# Patient Record
Sex: FEMALE | Race: OTHER | HISPANIC OR LATINO | URBAN - METROPOLITAN AREA
[De-identification: names, ages, dates, MRNs, and addresses within clinical notes are randomized per-mention and may not be internally consistent; named-entity substitution may affect disease eponyms.]

---

## 2022-05-07 ENCOUNTER — EMERGENCY (EMERGENCY)
Facility: HOSPITAL | Age: 32
LOS: 1 days | Discharge: HOME | End: 2022-05-07
Admitting: EMERGENCY MEDICINE
Payer: COMMERCIAL

## 2022-05-07 VITALS
OXYGEN SATURATION: 99 % | RESPIRATION RATE: 16 BRPM | DIASTOLIC BLOOD PRESSURE: 88 MMHG | WEIGHT: 156.09 LBS | TEMPERATURE: 98 F | SYSTOLIC BLOOD PRESSURE: 131 MMHG | HEART RATE: 96 BPM | HEIGHT: 65 IN

## 2022-05-07 PROCEDURE — 99283 EMERGENCY DEPT VISIT LOW MDM: CPT
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
## 2022-05-07 NOTE — ED PROVIDER NOTE - PATIENT PORTAL LINK FT
You can access the FollowMyHealth Patient Portal offered by [facility] by registering at the following website: [email or web address]. By joining [facility]’s FollowMyHealth portal, you will also be able to view your health information using other applications (apps) compatible with our system.

## 2022-05-07 NOTE — ED PROVIDER NOTE - PHYSICAL EXAMINATION
Physical Exam    Vital Signs: I have reviewed the initial vital signs.  Constitutional: well-nourished, appears stated age, no acute distress  Eyes: PERRLA, EOM intact, RAPD absent, +conjunctival injection, and symmetrical lids; no abrasions or ulcerations  ENT: Neck supple with no adenopathy, moist MM.

## 2022-05-07 NOTE — ED PROVIDER NOTE - NSFOLLOWUPCLINICS_GEN_ALL_ED_FT
[facility] - Ophthalmology Clinic  Ophthalmology  [address], [address]  [place], [place] [address]  Phone: [phone]  Fax:
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
## 2022-05-07 NOTE — ED PROVIDER NOTE - NS ED ROS FT
Review of Systems    Constitutional: (-) fever (-) weakness (-) diaphoresis   Eyes: (-) change in vision (-) photophobia  ENT: (-) sore throat (-) ear ache (-) nasal discharge
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
## 2022-05-07 NOTE — ED PROVIDER NOTE - NSFOLLOWUPINSTRUCTIONS_ED_ALL_ED_FT
Chemical Burn of the Eyes,   Chemicals, including many common household products, can burn your eyes. Burns can happen if a liquid chemical splashes into your eyes. They can also happen if a chemical powder or fume blows in your eyes. Chemicals can injure your eyes long after they first make contact.  Chemical eye burns can range from mild to severe. The severity of the burn depends on the chemical, the amount that got on the eye, and how long it was on the eye. Severe eye burns usually involve strong acids or alkalies. Examples include chemicals used in . Serious burns can damage the eyelids and eyeballs and cause scarring. They can result in permanent damage and blindness.  What are the causes?  This condition may be caused by:  An accident involving a household product, such as a , detergent, or paint thinner. An industrial accident, such as an explosion, spill, or fall. An airbag going off in a car accident. Airbags can release a chemical powder. What increases the risk?  You may be at greater risk for this condition if you work with chemicals. People who work with chemicals include plumbers, janitors, farmers,  workers, auto repair workers, and painters.  What are the signs or symptoms?  Symptoms of this condition include:  Eye pain. This may range from mild to so severe that you do not want to open your eyes. Mild to severe stinging or burning in the eye. Eye redness and swelling. Blurred vision. Deep eye pain when you are exposed to light. This can develop hours or days after the injury has occurred. A hole (perforation) in the eyeball. This is rare. A change in eyelid shape (deformity).Blindness. How is this diagnosed?  This condition is diagnosed with a medical history and a physical exam. Your health care provider will ask what type of chemical came in contact with your eye and ask you how and when the contact happened. This information helps him or her assess how severe your chemical burn is.  After your health care provider washes the chemical off your eye, she or he will examine the surface of your eye and eyelid. An eye specialist (ophthalmologist) should also do an eye exam to determine how severe the injury is and decide what treatment you need. This eye exam should be done within 24 hours to prevent complications and vision loss.  How is this treated?  This condition is treated by flushing the chemical out of your eye (irrigating your eye) with water or saline right away. Irrigation should continue for up to two hours after the injury occurred. Your health care provider may use a medicated eye drop to ease eye pain while the chemical is rinsed off your eye.  After the eye has been cleared of the chemical, treatment may include:  Prescription or over-the-counter medicine to ease pain and inflammation. Antibiotic drops or ointment applied directly to the eye to prevent or treat infection. Corticosteroid eye drops or ointments to reduce redness and irritation. Severe burns may also require:  A procedure to remove damaged tissue in or around the eye (debridement).Placement of a bandage soft contact lens or amniotic membrane tissue on the surface of the eye to protect the eye and help it heal. Surgery to reconstruct the eye and surrounding tissue, or to replace damaged parts of the eye with eye tissue from a donor. Follow these instructions at home:     Take over-the-counter and prescription medicines only as told by your health care provider. Do not drive or use heavy machinery while taking prescription pain medicine. Do not drive or use heavy machinery if your vision is blurry. Take or apply your antibiotic medicine, ointment, or drops as told by your health care provider. Do not stop using the antibiotic even if you start to feel better. Wash your hands with soap and water before you apply eye drops to your eyes. If soap and water are not available, use hand . Do not take baths, swim, or use a hot tub until your health care provider approves. Until the inflammation is gone or as long as directed by your health care provider:  Do not wear contact lenses. Wear glasses instead. Do not wear eye makeup. Do not touch or rub your eyes. Return to your normal activities as told by your health care provider. Ask your health care provider what activities are safe for you. Keep all follow-up visits as told by your health care provider. This is important. How is this prevented?  Wear safety glasses or goggles, a face shield, or a full-face respirator when using potentially dangerous chemicals. This equipment should not block your vision. It should be in good condition, fit properly, and feel comfortable. Do not combine chemicals from leaking or damaged containers. Do not dump chemicals down drains, storm sewers, or toilets. Do not try to burn household chemicals. Hasmukh containers clearly and set them aside in a well-ventilated area until they can be discarded properly. Do not try to use or work with unlabeled chemical containers. Use household products only according to directions from the . Use chemicals in well-ventilated areas with plenty of fresh air. Keep all chemicals out of reach of children and pets. Do not mix chemicals unless directions on the label tell you to do that. Contact a health care provider if:  Your symptoms do not improve or they get worse. You have fluid, blood, or pus coming from the eye area. Get help right away if:  You are in severe pain. You have vision loss.     Summary  Chemical eye burns can range from mild to severe. This condition is diagnosed with a medical history and a physical exam. This condition is treated by flushing the chemical out of your eye (irrigating) with water or saline right away. Take over-the-counter and prescription medicines only as told by your health care provider. Wear safety glasses or goggles, a face shield, or a full-face respirator when using potentially dangerous chemicals. This information is not intended to replace advice given to you by your health care provider. Make sure you discuss any questions you have with your health care provider.

## 2022-05-10 DIAGNOSIS — H57.89 OTHER SPECIFIED DISORDERS OF EYE AND ADNEXA: ICD-10-CM
